# Patient Record
Sex: MALE | Race: WHITE | NOT HISPANIC OR LATINO | Employment: FULL TIME | URBAN - METROPOLITAN AREA
[De-identification: names, ages, dates, MRNs, and addresses within clinical notes are randomized per-mention and may not be internally consistent; named-entity substitution may affect disease eponyms.]

---

## 2017-08-10 ENCOUNTER — TRANSCRIBE ORDERS (OUTPATIENT)
Dept: ADMINISTRATIVE | Facility: HOSPITAL | Age: 27
End: 2017-08-10

## 2017-08-10 ENCOUNTER — APPOINTMENT (OUTPATIENT)
Dept: LAB | Facility: MEDICAL CENTER | Age: 27
End: 2017-08-10

## 2017-08-10 DIAGNOSIS — Z00.8 HEALTH EXAMINATION IN POPULATION SURVEY: ICD-10-CM

## 2017-08-10 DIAGNOSIS — Z00.8 HEALTH EXAMINATION IN POPULATION SURVEY: Primary | ICD-10-CM

## 2017-08-10 LAB
CHOLEST SERPL-MCNC: 217 MG/DL (ref 50–200)
EST. AVERAGE GLUCOSE BLD GHB EST-MCNC: 108 MG/DL
HBA1C MFR BLD: 5.4 % (ref 4.2–6.3)
HDLC SERPL-MCNC: 47 MG/DL (ref 40–60)
LDLC SERPL CALC-MCNC: 135 MG/DL (ref 0–100)
TRIGL SERPL-MCNC: 176 MG/DL

## 2017-08-10 PROCEDURE — 80061 LIPID PANEL: CPT

## 2017-08-10 PROCEDURE — 36415 COLL VENOUS BLD VENIPUNCTURE: CPT

## 2017-08-10 PROCEDURE — 83036 HEMOGLOBIN GLYCOSYLATED A1C: CPT

## 2018-07-26 ENCOUNTER — APPOINTMENT (OUTPATIENT)
Dept: LAB | Facility: MEDICAL CENTER | Age: 28
End: 2018-07-26

## 2018-07-26 ENCOUNTER — TRANSCRIBE ORDERS (OUTPATIENT)
Dept: ADMINISTRATIVE | Facility: HOSPITAL | Age: 28
End: 2018-07-26

## 2018-07-26 DIAGNOSIS — Z00.8 ENCOUNTER FOR OTHER GENERAL EXAMINATION: ICD-10-CM

## 2018-07-26 DIAGNOSIS — Z00.8 ENCOUNTER FOR OTHER GENERAL EXAMINATION: Primary | ICD-10-CM

## 2018-07-26 LAB
CHOLEST SERPL-MCNC: 167 MG/DL (ref 50–200)
EST. AVERAGE GLUCOSE BLD GHB EST-MCNC: 105 MG/DL
HBA1C MFR BLD: 5.3 % (ref 4.2–6.3)
HDLC SERPL-MCNC: 44 MG/DL (ref 40–60)
LDLC SERPL CALC-MCNC: 97 MG/DL (ref 0–100)
NONHDLC SERPL-MCNC: 123 MG/DL
TRIGL SERPL-MCNC: 132 MG/DL

## 2018-07-26 PROCEDURE — 36415 COLL VENOUS BLD VENIPUNCTURE: CPT

## 2018-07-26 PROCEDURE — 83036 HEMOGLOBIN GLYCOSYLATED A1C: CPT

## 2018-07-26 PROCEDURE — 80061 LIPID PANEL: CPT

## 2020-03-26 ENCOUNTER — NURSE TRIAGE (OUTPATIENT)
Dept: OTHER | Facility: OTHER | Age: 30
End: 2020-03-26

## 2020-03-26 DIAGNOSIS — Z20.828 EXPOSURE TO SARS-ASSOCIATED CORONAVIRUS: Primary | ICD-10-CM

## 2020-03-26 DIAGNOSIS — Z20.828 EXPOSURE TO SARS-ASSOCIATED CORONAVIRUS: ICD-10-CM

## 2020-03-26 PROCEDURE — 87635 SARS-COV-2 COVID-19 AMP PRB: CPT

## 2020-03-26 NOTE — TELEPHONE ENCOUNTER
Pt was notified by manager tht he was in contact with a presumed positive pt  Went to work last night and started feeling ill  Temp was checked 100 8, sore throat, cough and very tired  Was advised to go home  Meets criteria for testing  Aware to wait one hour before going to testing site  Self quarantine already in effect

## 2020-03-26 NOTE — TELEPHONE ENCOUNTER
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

## 2020-03-26 NOTE — TELEPHONE ENCOUNTER
Reason for Disposition   [1] Fever or feeling feverish AND [2] within 14 Days of COVID-19 EXPOSURE (Close Contact)    Answer Assessment - Initial Assessment Questions  1  CONFIRMED CASE: "Who is the person with the confirmed COVID-19 infection that you were exposed to?"      Presumed hospitl pt  2  PLACE of CONTACT: "Where were you when you were exposed to COVID-19  (coronavirus disease 2019)?" (e g , city, state, country)      [facility]  3  TYPE of CONTACT: "How much contact was there?" (e g , live in same house, work in same office, same school)      Pt care  4  DATE of CONTACT: "When did you have contact with a coronavirus patient?" (e g , days)    Past few days  5  DURATION of CONTACT: "How long were you in contact with the COVID-19 (coronavirus disease) patient?" (e g , a few seconds, passed by person, a few minutes, live with the patient)     Time of care  6  SYMPTOMS: "Do you have any symptoms?" (e g , fever, cough, breathing difficulty)      Cough 100 8 temp, sore throat fatigue  7  PREGNANCY OR POSTPARTUM: "Is there any chance you are pregnant?" "When was your last menstrual period?" "Did you deliver in the last 2 weeks?"      n/a  8  HIGH RISK: "Do you have any heart or lung problems?  Do you have a weakened immune system?" (e g , CHF, COPD, asthma, HIV positive, chemotherapy, renal failure, diabetes mellitus, sickle cell anemia)      n/a    Protocols used: CORONAVIRUS (COVID-19) EXPOSURE-ADULT-[place]

## 2020-03-28 ENCOUNTER — AMB VIDEO VISIT (OUTPATIENT)
Dept: URGENT CARE | Facility: CLINIC | Age: 30
End: 2020-03-28

## 2020-03-28 DIAGNOSIS — H10.31 ACUTE BACTERIAL CONJUNCTIVITIS OF RIGHT EYE: Primary | ICD-10-CM

## 2020-03-28 RX ORDER — GENTAMICIN SULFATE 3 MG/ML
1 SOLUTION/ DROPS OPHTHALMIC EVERY 4 HOURS
Qty: 5 ML | Refills: 0 | Status: SHIPPED | OUTPATIENT
Start: 2020-03-28 | End: 2020-03-31

## 2020-03-28 NOTE — CARE ANYWHERE EVISITS
Visit Summary for Carine AVILA - Gender: Male - Date of Birth: [id number]  Date: [id number] - Duration: 10 minutes  Patient: Carine AVILA  Provider: Christi Land PA-C    Patient Contact Information  Address  [address] [place] [address]; [address]  [phone]    Visit Topics  Pink eye [Added By: Self - 2020-03-28]    Triage Questions   Have you had any international travel in the last 14 days? Answer [No]  Have you had any exposure to a known or expected Covid-19 patient in the last 14 days? Answer [Yes]  Do you have any immune system compromise or chronic lung disease? Answer [No]  Do you have any vulnerable family members in the home (infant, pregnant, cancer, elderly)? Answer [Yes]     Conversation Transcripts  [0A][0A] [Notification] You are connected with Christi Land PA-C, Urgent Care Specialist [0A][Notification] ISAAC AVILA is located in [place]  [0A][Notification] ISAAC AVILA has shared health history  Maegan Arriola  [0A]    Diagnosis    Procedures  Value: [id number] Code: CPT-4 UNLISTED E&M SERVICE    Medications Prescribed    No prescriptions ordered    Electronically signed by: Hayley Diggs(NPI [id number])

## 2020-03-28 NOTE — PROGRESS NOTES
Assessment/Plan   Diagnoses and all orders for this visit:    Acute bacterial conjunctivitis of right eye  -     gentamicin (GARAMYCIN) 0 3 % ophthalmic solution; Administer 1 drop to the right eye every 4 (four) hours for 7 days While awake     Prescription sent to the pharmacy for gentamicin-use as directed  May use warm compresses externally to decrease swelling of eyelid  Follow-up with an ophthalmologist if symptoms do not improve  Subjective   Patient ID: Ethel Boyer is a 34 y o  male  There were no vitals filed for this visit  HPI  The patient is a 19-year-old male who presents with right eye symptoms that started 2 days ago  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 until his COVID 23 results return  He is currently afebrile  Mild nonproductive cough  Negative shortness of breath or wheezing  Two days ago he noticed some itching and redness of his right eye  This morning his right eye was crusted shut  He denies any vision changes  Negative headache  Negative facial pain or tenderness  He does not wear contacts  Negative eye trauma  Mild congestion  Negative rhinorrhea  Review of Systems   Constitutional: Negative for activity change, chills, fatigue and fever  HENT: Negative for congestion, ear discharge, ear pain, facial swelling, mouth sores, postnasal drip, rhinorrhea, sinus pressure, sinus pain, sneezing, sore throat and trouble swallowing  Eyes: Positive for discharge, redness and itching  Negative for photophobia, pain and visual disturbance  Respiratory: Positive for cough  Negative for apnea, chest tightness, shortness of breath, wheezing and stridor  Cardiovascular: Negative for chest pain  Gastrointestinal: Negative for abdominal distention, abdominal pain, diarrhea, nausea and vomiting  Genitourinary: Negative for difficulty urinating  Musculoskeletal: Negative for arthralgias and myalgias     Skin: Negative for pallor and rash    Allergic/Immunologic: Negative  Neurological: Negative for dizziness, light-headedness and headaches  Hematological: Negative  Psychiatric/Behavioral: Negative  All other systems reviewed and are negative  Objective   Physical Exam  Well-appearing male in no acute distress  No audible wheezing or stridor  Right eye conjunctivitis with upper and lower eyelid erythema and edema  I am unable to visualize drainage from the eye

## 2020-03-31 ENCOUNTER — TELEMEDICINE (OUTPATIENT)
Dept: FAMILY MEDICINE CLINIC | Facility: CLINIC | Age: 30
End: 2020-03-31
Payer: COMMERCIAL

## 2020-03-31 VITALS — WEIGHT: 185 LBS | HEIGHT: 72 IN | BODY MASS INDEX: 25.06 KG/M2

## 2020-03-31 DIAGNOSIS — Z11.4 SCREENING FOR HUMAN IMMUNODEFICIENCY VIRUS: ICD-10-CM

## 2020-03-31 DIAGNOSIS — R42 DIZZINESS: ICD-10-CM

## 2020-03-31 DIAGNOSIS — J06.9 ACUTE URI: Primary | ICD-10-CM

## 2020-03-31 LAB — SARS-COV-2 RNA SPEC QL NAA+PROBE: NOT DETECTED

## 2020-03-31 PROCEDURE — 99203 OFFICE O/P NEW LOW 30 MIN: CPT | Performed by: INTERNAL MEDICINE

## 2020-03-31 RX ORDER — LEVOFLOXACIN 500 MG/1
500 TABLET, FILM COATED ORAL EVERY 24 HOURS
Qty: 10 TABLET | Refills: 0 | Status: SHIPPED | OUTPATIENT
Start: 2020-03-31 | End: 2020-04-10

## 2020-03-31 RX ORDER — GUAIFENESIN/DEXTROMETHORPHAN 100-10MG/5
5 SYRUP ORAL 3 TIMES DAILY PRN
Qty: 118 ML | Refills: 1 | Status: SHIPPED | OUTPATIENT
Start: 2020-03-31

## 2020-03-31 NOTE — ASSESSMENT & PLAN NOTE
Bed Rest  Off work  1 week  Increase Po fluids  Levaquin 500 mg daily Lunch 10  Guaifenesin Liquid  RTC in 1-2 weeks

## 2020-03-31 NOTE — PROGRESS NOTES
Virtual Regular Visit    Problem List Items Addressed This Visit        Respiratory    Acute URI - Primary     Bed Rest  Off work  1 week  Increase Po fluids  Levaquin 500 mg daily Lunch 10  Guaifenesin Liquid  RTC in 1-2 weeks         Relevant Medications    levofloxacin (LEVAQUIN) 500 mg tablet    dextromethorphan-guaiFENesin (ROBITUSSIN DM)  mg/5 mL syrup       Other    Dizziness     Most likely is due to acute URI/Sinusitis and Mild Dehydration : Bed rest  Increase Po fluids  Stay off work 1 week  As above    Relevant Medications    levofloxacin (LEVAQUIN) 500 mg tablet    dextromethorphan-guaiFENesin (ROBITUSSIN DM)  mg/5 mL syrup      Other Visit Diagnoses     Screening for human immunodeficiency virus                   Reason for visit is : new Patient visit    Encounter provider Roula Negron MD    Provider located at [place] [address]  [address] [place] [address]  [address] [address]      Recent Visits  No visits were found meeting these conditions  Showing recent visits within past 7 days and meeting all other requirements     Today's Visits  Date Type Provider Dept   03/31/20 Carmen Lion MD [facility] [place]   Showing today's visits and meeting all other requirements     Future Appointments  No visits were found meeting these conditions  Showing future appointments within next 150 days and meeting all other requirements        The patient was identified by name and date of birth  Cinthia Burgess was informed that this is a telemedicine visit and that the visit is being conducted through [address] [place] [address] and patient was informed that this is not a secure, HIPAA-complaint platform  he agrees to proceed     My office door was closed  No one else was in the room  He acknowledged consent and understanding of privacy and security of the video platform   The patient has agreed to participate and understands they can discontinue the visit at any time  Patient is aware this is a billable service  Subjective  Shannon Raza is a 34 y o  male first time Video visit, since last week he has been coughing, had one time Fever, COVId 19 was Negative, No other symptoms,  Detailed H/P Discussed w Pt     No past medical history on file  History reviewed  No pertinent surgical history  Current Outpatient Medications   Medication Sig Dispense Refill    dextromethorphan-guaiFENesin (ROBITUSSIN DM)  mg/5 mL syrup Take 5 mL by mouth 3 (three) times a day as needed for cough or congestion 118 mL 1    levofloxacin (LEVAQUIN) 500 mg tablet Take 1 tablet (500 mg total) by mouth every 24 hours for 10 days With food/Lunch 10 tablet 0     No current facility-administered medications for this visit  No Known Allergies    Review of Systems   Constitutional: Negative for chills, fatigue and fever  HENT: Positive for postnasal drip and sinus pressure  Negative for congestion, facial swelling, sore throat, trouble swallowing and voice change  Eyes: Negative for pain, discharge and visual disturbance  Respiratory: Positive for cough  Negative for shortness of breath and wheezing  Cardiovascular: Negative for chest pain, palpitations and leg swelling  Gastrointestinal: Negative for abdominal pain, blood in stool, constipation, diarrhea and nausea  Endocrine: Negative for polydipsia, polyphagia and polyuria  Genitourinary: Negative for difficulty urinating, hematuria and urgency  Musculoskeletal: Negative for arthralgias and myalgias  Skin: Negative for rash  Neurological: Positive for dizziness  Negative for tremors, weakness and headaches  Hematological: Negative for adenopathy  Does not bruise/bleed easily  Psychiatric/Behavioral: Negative for dysphoric mood, sleep disturbance and suicidal ideas         Video Exam    Vitals:    03/31/20 1548   Weight: 83 9 kg (185 lb)   Height: 6' (1 829 m) Physical Exam   Constitutional: He is oriented to person, place, and time  He appears well-developed and well-nourished  No distress  HENT:   Head: Normocephalic  Right Ear: External ear normal    Left Ear: External ear normal    Eyes: Pupils are equal, round, and reactive to light  EOM are normal    Neck: Neck supple  No tracheal deviation present  No thyromegaly present  Cardiovascular: Normal rate, regular rhythm and normal heart sounds  Exam reveals no friction rub  No murmur heard  Pulmonary/Chest: Effort normal and breath sounds normal  No respiratory distress  He has no wheezes  Abdominal: Soft  Bowel sounds are normal  He exhibits no distension  Musculoskeletal: Normal range of motion  He exhibits no edema, tenderness or deformity  Neurological: He is alert and oriented to person, place, and time  He displays normal reflexes  No cranial nerve deficit  Coordination normal    Skin: Skin is warm and dry  No rash noted  No erythema  Psychiatric: He has a normal mood and affect  His behavior is normal         I spent  20  minutes with the patient during this visit

## 2020-03-31 NOTE — ASSESSMENT & PLAN NOTE
Most likely is due to acute URI/Sinusitis and Mild Dehydration : Bed rest  Increase Po fluids  Stay off work 1 week  As above

## 2020-12-29 ENCOUNTER — TELEPHONE (OUTPATIENT)
Dept: FAMILY MEDICINE CLINIC | Facility: CLINIC | Age: 30
End: 2020-12-29

## 2020-12-29 ENCOUNTER — NURSE TRIAGE (OUTPATIENT)
Dept: OTHER | Facility: OTHER | Age: 30
End: 2020-12-29

## 2020-12-29 DIAGNOSIS — Z20.822 EXPOSURE TO COVID-19 VIRUS: Primary | ICD-10-CM

## 2020-12-29 DIAGNOSIS — Z20.822 COVID-19 RULED OUT: Primary | ICD-10-CM

## 2020-12-29 DIAGNOSIS — Z20.822 COVID-19 RULED OUT: ICD-10-CM

## 2020-12-29 PROCEDURE — U0003 INFECTIOUS AGENT DETECTION BY NUCLEIC ACID (DNA OR RNA); SEVERE ACUTE RESPIRATORY SYNDROME CORONAVIRUS 2 (SARS-COV-2) (CORONAVIRUS DISEASE [COVID-19]), AMPLIFIED PROBE TECHNIQUE, MAKING USE OF HIGH THROUGHPUT TECHNOLOGIES AS DESCRIBED BY CMS-2020-01-R: HCPCS | Performed by: INTERNAL MEDICINE

## 2020-12-31 LAB — SARS-COV-2 RNA SPEC QL NAA+PROBE: DETECTED

## 2021-01-07 ENCOUNTER — TELEMEDICINE (OUTPATIENT)
Dept: FAMILY MEDICINE CLINIC | Facility: CLINIC | Age: 31
End: 2021-01-07
Payer: COMMERCIAL

## 2021-01-07 DIAGNOSIS — Z11.4 SCREENING FOR HUMAN IMMUNODEFICIENCY VIRUS: Primary | ICD-10-CM

## 2021-01-07 DIAGNOSIS — U07.1 LAB TEST POSITIVE FOR DETECTION OF COVID-19 VIRUS: ICD-10-CM

## 2021-01-07 PROCEDURE — 99213 OFFICE O/P EST LOW 20 MIN: CPT | Performed by: INTERNAL MEDICINE

## 2021-01-07 NOTE — LETTER
January 7, 2021     Patient: Hina Ponce   YOB: 1990   Date of Visit: 1/7/2021       To Whom it May Concern:    Hina Ponce is under my professional care  He was seen in my office on 1/7/2021  He may return to work on Monday, January 11, 2021  If you have any questions or concerns, please don't hesitate to call           Sincerely,              Davidson Mills MD        CC: No Recipients

## 2021-01-07 NOTE — PROGRESS NOTES
Virtual Regular Visit      Assessment/Plan:    Problem List Items Addressed This Visit        Other    Lab test positive for detection of COVID-19 virus     Pt feels ok  No symptoms,  RTW on Monday 1/11/21  RTC in 1-2 mos w Blood work         Screening for human immunodeficiency virus - Primary     With next visit         Relevant Orders    HIV 1/2 Antigen/Antibody (4th Generation) w Reflex [facility]               Reason for visit is   Chief Complaint   Patient presents with    Virtual Regular Visit    Virtual Regular Visit        Encounter provider Jhon Wells MD    Provider located at [facility]  [address] [address]  [phone]      Recent Visits  No visits were found meeting these conditions  Showing recent visits within past 7 days and meeting all other requirements     Today's Visits  Date Type Provider Dept   01/07/21 Roddy Ganser, MD [facility] [place]   Showing today's visits and meeting all other requirements     Future Appointments  No visits were found meeting these conditions  Showing future appointments within next 150 days and meeting all other requirements        The patient was identified by name and date of birth  Corby Escamilla was informed that this is a telemedicine visit and that the visit is being conducted through [employer] and patient was informed that this is not a secure, HIPAA-compliant platform  He agrees to proceed     My office door was closed  No one else was in the room  He acknowledged consent and understanding of privacy and security of the video platform  The patient has agreed to participate and understands they can discontinue the visit at any time  Patient is aware this is a billable service  Subjective  Corby Escamilla is a 31 y o  male is here for Virtual visit as Below :        31 Y O Man is here for [facility] visit, He feels ok, no New symptoms,           History reviewed  No pertinent past medical history  History reviewed  No pertinent surgical history  Current Outpatient Medications   Medication Sig Dispense Refill    dextromethorphan-guaiFENesin (ROBITUSSIN DM)  mg/5 mL syrup Take 5 mL by mouth 3 (three) times a day as needed for cough or congestion 118 mL 1     No current facility-administered medications for this visit  No Known Allergies    Review of Systems   Constitutional: Negative for chills, fatigue and fever  HENT: Negative for congestion, facial swelling, sore throat, trouble swallowing and voice change  Eyes: Negative for pain, discharge and visual disturbance  Respiratory: Negative for cough, shortness of breath and wheezing  Cardiovascular: Negative for chest pain, palpitations and leg swelling  Gastrointestinal: Negative for abdominal pain, blood in stool, constipation, diarrhea and nausea  Endocrine: Negative for polydipsia, polyphagia and polyuria  Genitourinary: Negative for difficulty urinating, hematuria and urgency  Musculoskeletal: Negative for arthralgias and myalgias  Skin: Negative for rash  Neurological: Negative for dizziness, tremors, weakness and headaches  Hematological: Negative for adenopathy  Does not bruise/bleed easily  Psychiatric/Behavioral: Negative for dysphoric mood, sleep disturbance and suicidal ideas  Video Exam    There were no vitals filed for this visit  Physical Exam  Constitutional:       General: He is not in acute distress  HENT:      Head: Normocephalic  Mouth/Throat:      Pharynx: No oropharyngeal exudate  Eyes:      General: No scleral icterus  Conjunctiva/sclera: Conjunctivae normal       Pupils: Pupils are equal, round, and reactive to light  Neck:      Musculoskeletal: Neck supple  Thyroid: No thyromegaly  Cardiovascular:      Rate and Rhythm: Normal rate and regular rhythm  Heart sounds: Normal heart sounds  No murmur     Pulmonary: Effort: Pulmonary effort is normal  No respiratory distress  Breath sounds: Normal breath sounds  No wheezing or rales  Abdominal:      General: Bowel sounds are normal  There is no distension  Palpations: Abdomen is soft  Tenderness: There is no abdominal tenderness  There is no guarding or rebound  Musculoskeletal:         General: No tenderness  Lymphadenopathy:      Cervical: No cervical adenopathy  Skin:     Coloration: Skin is not pale  Neurological:      Mental Status: He is alert and oriented to person, place, and time  I spent 20 minutes directly with the patient during this visit      VIRTUAL VISIT [address] [place] [address] acknowledges that he has consented to an online visit or consultation  He understands that the online visit is based solely on information provided by him, and that, in the absence of a face-to-face physical evaluation by the physician, the diagnosis he receives is both limited and provisional in terms of accuracy and completeness  This is not intended to replace a full medical face-to-face evaluation by the physician  Louie Rolle understands and accepts these terms

## 2021-01-15 ENCOUNTER — IMMUNIZATIONS (OUTPATIENT)
Dept: FAMILY MEDICINE CLINIC | Facility: HOSPITAL | Age: 31
End: 2021-01-15

## 2021-01-15 DIAGNOSIS — Z23 ENCOUNTER FOR IMMUNIZATION: Primary | ICD-10-CM

## 2021-01-15 PROCEDURE — 91300 SARS-COV-2 / COVID-19 MRNA VACCINE (PFIZER-BIONTECH) 30 MCG: CPT

## 2021-01-15 PROCEDURE — 0001A SARS-COV-2 / COVID-19 MRNA VACCINE (PFIZER-BIONTECH) 30 MCG: CPT

## 2021-02-03 ENCOUNTER — IMMUNIZATIONS (OUTPATIENT)
Dept: FAMILY MEDICINE CLINIC | Facility: HOSPITAL | Age: 31
End: 2021-02-03

## 2021-02-03 DIAGNOSIS — Z23 ENCOUNTER FOR IMMUNIZATION: Primary | ICD-10-CM

## 2021-02-03 PROCEDURE — 91300 SARS-COV-2 / COVID-19 MRNA VACCINE (PFIZER-BIONTECH) 30 MCG: CPT

## 2021-02-03 PROCEDURE — 0002A SARS-COV-2 / COVID-19 MRNA VACCINE (PFIZER-BIONTECH) 30 MCG: CPT

## 2021-09-01 ENCOUNTER — APPOINTMENT (OUTPATIENT)
Dept: LAB | Facility: HOSPITAL | Age: 31
End: 2021-09-01

## 2021-09-01 ENCOUNTER — APPOINTMENT (OUTPATIENT)
Dept: LAB | Facility: HOSPITAL | Age: 31
End: 2021-09-01
Payer: COMMERCIAL

## 2021-09-01 DIAGNOSIS — Z11.4 SCREENING FOR HUMAN IMMUNODEFICIENCY VIRUS: ICD-10-CM

## 2021-09-01 DIAGNOSIS — Z00.8 HEALTH EXAMINATION IN POPULATION SURVEY: ICD-10-CM

## 2021-09-01 LAB
CHOLEST SERPL-MCNC: 189 MG/DL (ref 50–200)
EST. AVERAGE GLUCOSE BLD GHB EST-MCNC: 103 MG/DL
HBA1C MFR BLD: 5.2 %
HDLC SERPL-MCNC: 39 MG/DL
LDLC SERPL CALC-MCNC: 100 MG/DL (ref 0–100)
NONHDLC SERPL-MCNC: 150 MG/DL
TRIGL SERPL-MCNC: 251 MG/DL

## 2021-09-01 PROCEDURE — 80061 LIPID PANEL: CPT

## 2021-09-01 PROCEDURE — 36415 COLL VENOUS BLD VENIPUNCTURE: CPT

## 2021-09-01 PROCEDURE — 87389 HIV-1 AG W/HIV-1&-2 AB AG IA: CPT

## 2021-09-01 PROCEDURE — 83036 HEMOGLOBIN GLYCOSYLATED A1C: CPT

## 2021-09-02 LAB — HIV 1+2 AB+HIV1 P24 AG SERPL QL IA: NORMAL

## 2021-12-16 ENCOUNTER — IMMUNIZATIONS (OUTPATIENT)
Dept: FAMILY MEDICINE CLINIC | Facility: HOSPITAL | Age: 31
End: 2021-12-16

## 2021-12-16 DIAGNOSIS — Z23 ENCOUNTER FOR IMMUNIZATION: Primary | ICD-10-CM

## 2021-12-16 PROCEDURE — 91300 COVID-19 PFIZER VACC 0.3 ML: CPT

## 2021-12-16 PROCEDURE — 0001A COVID-19 PFIZER VACC 0.3 ML: CPT

## 2022-08-25 ENCOUNTER — OFFICE VISIT (OUTPATIENT)
Dept: FAMILY MEDICINE CLINIC | Facility: CLINIC | Age: 32
End: 2022-08-25
Payer: COMMERCIAL

## 2022-08-25 VITALS
HEIGHT: 73 IN | WEIGHT: 183 LBS | OXYGEN SATURATION: 98 % | DIASTOLIC BLOOD PRESSURE: 72 MMHG | BODY MASS INDEX: 24.25 KG/M2 | HEART RATE: 74 BPM | TEMPERATURE: 97.7 F | SYSTOLIC BLOOD PRESSURE: 120 MMHG

## 2022-08-25 DIAGNOSIS — Z76.89 ENCOUNTER TO ESTABLISH CARE: ICD-10-CM

## 2022-08-25 DIAGNOSIS — Z00.00 ANNUAL PHYSICAL EXAM: Primary | ICD-10-CM

## 2022-08-25 PROCEDURE — 99385 PREV VISIT NEW AGE 18-39: CPT | Performed by: FAMILY MEDICINE

## 2022-08-25 NOTE — ASSESSMENT & PLAN NOTE
- Satisfactory physical examination  - Hemoglobin A1C and lipid panel ordered   - Patient declines Hepatitis C screening at this time  - Return in the fall/winter for Influenza vaccination

## 2022-08-25 NOTE — PROGRESS NOTES
ADULT ANNUAL PHYSICAL  [address] [place] [address]    NAME: Marine Drummond  AGE: 36 y o  SEX: male  : 1990     DATE: 2022     Assessment and Plan:     Problem List Items Addressed This Visit        Other    Annual physical exam - Primary     - Satisfactory physical examination  - Hemoglobin A1C and lipid panel ordered   - Patient declines Hepatitis C screening at this time  - Return in the fall/winter for Influenza vaccination          Relevant Orders    Hemoglobin A1C    Lipid Panel with Direct LDL reflex      Other Visit Diagnoses     Encounter to establish care              Immunizations and preventive care screenings were discussed with patient today  Appropriate education was printed on patient's after visit summary  Counseling:  · Exercise: the importance of regular exercise/physical activity was discussed  Recommend exercise 3-5 times per week for at least 30 minutes  Return in about 1 year (around 2023), or if symptoms worsen or fail to improve, for Annual physical      Chief Complaint:     Chief Complaint   Patient presents with    Caring Starts With You      History of Present Illness:     Adult Annual Physical   Marine Drummond is a pleasant 36year old male with no past medical history who presents today for a comprehensive physical exam as part of the 'Caring Starts With You' initiative by [facility]  Overall he feels well and endorses no complaints at this time  Diet and Physical Activity  · Diet/Nutrition: Patient tries to adhere to a well balanced diet  · Exercise: no formal exercise  Depression Screening  PHQ-2/9 Depression Screening    Little interest or pleasure in doing things: 0 - not at all  Feeling down, depressed, or hopeless: 0 - not at all  PHQ-2 Score: 0  PHQ-2 Interpretation: Negative depression screen       General Health  · Sleep: Gets 6-7 hours of sleep on average  · Hearing: No hearing issues    · Vision: no vision problems  · Dental: regular dental visits  [facility]  · History of STDs?: no      Review of Systems:     Review of Systems   Constitutional: Negative  HENT: Negative  Eyes: Negative  Respiratory: Negative  Cardiovascular: Negative  Gastrointestinal: Negative  Musculoskeletal: Negative  Skin: Negative  Neurological: Negative  Psychiatric/Behavioral: Negative  Past Medical History:     History reviewed  No pertinent past medical history  Past Surgical History:     History reviewed  No pertinent surgical history  Social History:     Social History     Socioeconomic History    Marital status: /Civil Union     Spouse name: None    Number of children: None    Years of education: None    Highest education level: None   Occupational History    None   Tobacco Use    Smoking status: Never Smoker    Smokeless tobacco: Never Used   Vaping Use    Vaping Use: Never used   Substance and Sexual Activity    Alcohol use: Never    Drug use: Never    Sexual activity: None   Other Topics Concern    None   Social History Narrative    None     Social Determinants of Health     Financial Resource Strain: Not on file   Food Insecurity: Not on file   Transportation Needs: Not on file   Physical Activity: Not on file   Stress: Not on file   Social Connections: Not on file   Intimate Partner Violence: Not on file   Housing Stability: Not on file      Family History:     Family History   Problem Relation Age of Onset    Thyroid disease Father     Diabetes Maternal Grandmother     Diabetes Maternal Grandfather     Diabetes Paternal Grandmother     Diabetes Paternal Grandfather       Current Medications:     Current Outpatient Medications   Medication Sig Dispense Refill    Minoxidil 5 % FOAM  (Patient not taking: Reported on 8/25/2022)       No current facility-administered medications for this visit        Allergies:     No Known Allergies   Physical Exam:     BP 120/72   Pulse 74   Temp 97 7 °F (36 5 °C) (Skin)   Ht 6' 1" (1 854 m)   Wt 83 kg (183 lb)   SpO2 98%   BMI 24 14 kg/m²     Physical Exam  Constitutional:       General: He is not in acute distress  Appearance: He is not ill-appearing  HENT:      Head: Normocephalic and atraumatic  Mouth/Throat:      Mouth: Mucous membranes are moist       Pharynx: No oropharyngeal exudate or posterior oropharyngeal erythema  Eyes:      General:         Right eye: No discharge  Left eye: No discharge  Extraocular Movements: Extraocular movements intact  Pupils: Pupils are equal, round, and reactive to light  Cardiovascular:      Rate and Rhythm: Normal rate  Pulses: Normal pulses  Heart sounds: No murmur heard  Pulmonary:      Effort: Pulmonary effort is normal  No respiratory distress  Breath sounds: No wheezing  Abdominal:      General: Bowel sounds are normal  There is no distension  Palpations: Abdomen is soft  Tenderness: There is no abdominal tenderness  There is no guarding  Musculoskeletal:      Cervical back: Normal range of motion and neck supple  Right lower leg: No edema  Left lower leg: No edema  Lymphadenopathy:      Cervical: No cervical adenopathy  Neurological:      General: No focal deficit present  Mental Status: He is alert  Motor: No weakness        Coordination: Coordination normal       Gait: Gait normal       Deep Tendon Reflexes: Reflexes normal    Psychiatric:         Mood and Affect: Mood normal          Behavior: Behavior normal           Bhavna Patel MD   [address]

## 2023-05-17 ENCOUNTER — HOSPITAL ENCOUNTER (EMERGENCY)
Facility: HOSPITAL | Age: 33
Discharge: HOME | End: 2023-05-17
Attending: EMERGENCY MEDICINE

## 2023-05-17 VITALS
DIASTOLIC BLOOD PRESSURE: 84 MMHG | SYSTOLIC BLOOD PRESSURE: 142 MMHG | HEART RATE: 114 BPM | RESPIRATION RATE: 20 BRPM | OXYGEN SATURATION: 99 %

## 2023-05-17 DIAGNOSIS — R00.0 RAPID HEART RATE: ICD-10-CM

## 2023-05-17 DIAGNOSIS — I47.1 SVT (SUPRAVENTRICULAR TACHYCARDIA) (HCC): Primary | ICD-10-CM

## 2023-05-17 LAB
ANION GAP SERPL CALCULATED.3IONS-SCNC: 3 MMOL/L (ref 4–13)
BUN SERPL-MCNC: 15 MG/DL (ref 5–25)
CALCIUM SERPL-MCNC: 9.3 MG/DL (ref 8.3–10.1)
CHLORIDE SERPL-SCNC: 107 MMOL/L (ref 96–108)
CO2 SERPL-SCNC: 25 MMOL/L (ref 21–32)
CREAT SERPL-MCNC: 1.15 MG/DL (ref 0.6–1.3)
GFR SERPL CREATININE-BSD FRML MDRD: 84 ML/MIN/1.73SQ M
GLUCOSE SERPL-MCNC: 117 MG/DL (ref 65–140)
POTASSIUM SERPL-SCNC: 3.5 MMOL/L (ref 3.5–5.3)
SODIUM SERPL-SCNC: 135 MMOL/L (ref 135–147)

## 2023-05-17 RX ORDER — ADENOSINE 3 MG/ML
INJECTION, SOLUTION INTRAVENOUS
Status: COMPLETED
Start: 2023-05-17 | End: 2023-05-17

## 2023-05-17 RX ORDER — ADENOSINE 3 MG/ML
6 INJECTION INTRAVENOUS ONCE
Status: COMPLETED | OUTPATIENT
Start: 2023-05-17 | End: 2023-05-17

## 2023-05-17 RX ADMIN — SODIUM CHLORIDE 1000 ML: 0.9 INJECTION, SOLUTION INTRAVENOUS at 22:26

## 2023-05-17 RX ADMIN — ADENOSINE 6 MG: 3 INJECTION INTRAVENOUS at 22:21

## 2023-05-17 NOTE — Clinical Note
Wellington Mueller was seen and treated in our emergency department on 5/17/2023  No restrictions            Diagnosis: ISAACT    Lui    He may return on this date: 05/17/2023         If you have any questions or concerns, please don't hesitate to call        Ann Chadwick MD    ______________________________           _______________          _______________  Hospital Representative                              Date                                Time

## 2023-05-18 LAB
ATRIAL RATE: 149 BPM
ATRIAL RATE: 199 BPM
P AXIS: -65 DEGREES
P AXIS: 62 DEGREES
PR INTERVAL: 140 MS
QRS AXIS: 101 DEGREES
QRS AXIS: 98 DEGREES
QRSD INTERVAL: 76 MS
QRSD INTERVAL: 82 MS
QT INTERVAL: 232 MS
QT INTERVAL: 258 MS
QTC INTERVAL: 406 MS
QTC INTERVAL: 422 MS
T WAVE AXIS: -23 DEGREES
T WAVE AXIS: -23 DEGREES
VENTRICULAR RATE: 149 BPM
VENTRICULAR RATE: 199 BPM

## 2023-05-18 NOTE — DISCHARGE INSTRUCTIONS
Make sure to follow up with cardiology  Decrease/Limit caffeine intake  Return immediately to the ED for similar symptoms or any new or concerning symptoms

## 2023-05-18 NOTE — ED PROVIDER NOTES
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 and got a notification from his watch about high heart rate with some palpitations  Patient's coworkers placed a pulse oximeter on that read as heart rate in the 230s  Patient denied dizziness on exam   Patient states he only came down due to the urging of his coworkers  Denies cocaine use, endorses drinking 1 energy drink with caffeine earlier today  Denies fevers, chills, nausea, vomiting, abdominal pain, chest pain, shortness of breath, recent changes in medication or health history, or any other complaints or concerns at this time  Prior to Admission Medications   Prescriptions Last Dose Informant Patient Reported? Taking? Minoxidil 5 % FOAM   Yes No   Patient not taking: Reported on 8/25/2022      Facility-Administered Medications: None       History reviewed  No pertinent past medical history  History reviewed  No pertinent surgical history  Family History   Problem Relation Age of Onset   • Thyroid disease Father    • Diabetes Maternal Grandmother    • Diabetes Maternal Grandfather    • Diabetes Paternal Grandmother    • Diabetes Paternal Grandfather      I have reviewed and agree with the history as documented      E-Cigarette/Vaping   • E-Cigarette Use Never User      E-Cigarette/Vaping Substances   • Nicotine No    • THC No    • CBD No    • Flavoring No    • Other No    • Unknown No      Social History     Tobacco Use   • Smoking status: Never   • Smokeless tobacco: Never   Vaping Use   • Vaping Use: Never used   Substance Use Topics   • Alcohol use: Never   • Drug use: Never        Review of Systems Constitutional: Negative for chills and fever  HENT: Negative for congestion and sore throat  Eyes: Negative for pain and visual disturbance  Respiratory: Negative for cough and shortness of breath  Cardiovascular: Positive for palpitations  Negative for chest pain  Gastrointestinal: Negative for abdominal pain, diarrhea, nausea and vomiting  Genitourinary: Negative for dysuria and hematuria  Musculoskeletal: Negative for back pain and myalgias  Skin: Negative for color change and rash  Neurological: Negative for dizziness and headaches  All other systems reviewed and are negative  Physical Exam  ED Triage Vitals   Temp Pulse Respirations Blood Pressure SpO2   -- 05/17/23 2210 05/17/23 2210 05/17/23 2213 05/17/23 2210    (!) 228 22 (!) 168/103 100 %      Temp src Heart Rate Source Patient Position - Orthostatic VS BP Location FiO2 (%)   -- 05/17/23 2210 05/17/23 2213 05/17/23 2213 --    Monitor Lying Right arm       Pain Score       05/17/23 2210       No Pain             Orthostatic Vital Signs  Vitals:    05/17/23 2210 05/17/23 2213 05/17/23 2330   BP:  (!) 168/103 142/84   Pulse: (!) 228  (!) 114   Patient Position - Orthostatic VS:  Lying Lying       Physical Exam  Vitals and nursing note reviewed  Constitutional:       General: He is not in acute distress  HENT:      Head: Normocephalic and atraumatic  Nose: No congestion or rhinorrhea  Mouth/Throat:      Mouth: Mucous membranes are moist       Pharynx: Oropharynx is clear  Eyes:      General: No scleral icterus  Extraocular Movements: Extraocular movements intact  Conjunctiva/sclera: Conjunctivae normal    Cardiovascular:      Rate and Rhythm: Regular rhythm  Tachycardia present  Pulses: Normal pulses  Pulmonary:      Effort: Pulmonary effort is normal  No respiratory distress  Breath sounds: Normal breath sounds  No wheezing, rhonchi or rales  Abdominal:      General: There is no distension  Tenderness: There is no abdominal tenderness  There is no guarding or rebound  Musculoskeletal:         General: No deformity or signs of injury  Normal range of motion  Cervical back: Normal range of motion and neck supple  Skin:     General: Skin is warm  Capillary Refill: Capillary refill takes less than 2 seconds  Coloration: Skin is not pale  Findings: No bruising  Neurological:      Mental Status: He is alert  Mental status is at baseline     Psychiatric:         Mood and Affect: Mood normal          Behavior: Behavior normal          ED Medications  Medications   adenosine (ADENOCARD) injection 6 mg (6 mg Intravenous Given 5/17/23 2221)   sodium chloride 0 9 % bolus 1,000 mL (0 mL Intravenous Stopped 5/17/23 2356)       Diagnostic Studies  Results Reviewed     Procedure Component Value Units Date/Time    Basic metabolic panel [[id number]]  (Abnormal) Collected: 05/17/23 2241    Lab Status: Final result Specimen: Blood from Arm, Left Updated: 05/17/23 2327     Sodium 135 mmol/L      Potassium 3 5 mmol/L      Chloride 107 mmol/L      CO2 25 mmol/L      ANION GAP 3 mmol/L      BUN 15 mg/dL      Creatinine 1 15 mg/dL      Glucose 117 mg/dL      Calcium 9 3 mg/dL      eGFR 84 ml/min/1 73sq m     Narrative:      [place] guidelines for Chronic Kidney Disease (CKD):   •  Stage 1 with normal or high GFR (GFR > 90 mL/min/1 73 square meters)  •  Stage 2 Mild CKD (GFR = 60-89 mL/min/1 73 square meters)  •  Stage 3A Moderate CKD (GFR = 45-59 mL/min/1 73 square meters)  •  Stage 3B Moderate CKD (GFR = 30-44 mL/min/1 73 square meters)  •  Stage 4 Severe CKD (GFR = 15-29 mL/min/1 73 square meters)  •  Stage 5 End Stage CKD (GFR <15 mL/min/1 73 square meters)  Note: GFR calculation is accurate only with a steady state creatinine                 No orders to display         Procedures  Procedures      ED Course                                       Medical Decision Making  Amount and/or Complexity of Data Reviewed  Labs: ordered  Risk  Prescription drug management  Patient is a 30-year-old male presents ED for evaluation of tachycardia  Patient seen immediately upon arrival, noted to have heart rate in the 230s  EKG shows SVT greater than 200  Vagal maneuvers x2 without success  Patient treated with adenosine 6 mg IV with successful conversion to sinus tachycardia  Normal saline 1 L bolus ordered  BMP ordered  See ED course for additional details  Patient reevaluated, vital signs stable  We will continue to monitor on telemetry  Initial EKG at 2214 as read by me shows SVT at 199 bpm with right axis deviation, no ST elevations or depressions  No prior for comparison  Repeat EKG at 2221 after adenosine administration as read by me shows sinus tachycardia at 149 with right axis deviation, no ST elevations or depressions  Improved compared to prior  Discussed results and plan with patient  Advised on need for outpatient follow up, given information for cardiology  Given return precautions verbally and in discharge instructions  Given work note  All questions answered  Patient expressed verbal understanding and is agreeable with plan for discharge with outpatient follow up  Disposition  Final diagnoses:   SVT (supraventricular tachycardia) ([facility] )   Rapid heart rate     Time reflects when diagnosis was documented in both MDM as applicable and the Disposition within this note     Time User Action Codes Description Comment    5/17/2023 11:35 PM Pritesh Villarreal [I47 1] SVT (supraventricular tachycardia) ([facility] )     5/17/2023 11:35 PM Stone Sheehan [R00 0] Rapid heart rate       ED Disposition     ED Disposition   Discharge    Condition   Stable    Date/Time   Wed May 17, 2023 11:35 PM    Bronwyn Bowles discharge to home/self care                 Follow-up Information     Follow up With Specialties Details Why Contact Info Additional [address] Emergency Department Emergency Medicine Go to  If symptoms worsen [address] [id number]  [facility] Emergency Department, [address] [address], [place], [place], [address]    1282 [facility] Cardiology Call today To make appt  for evaluation in the next month 4940 [facility], [address] [place], [place] [id number], [place], [address]          Discharge Medication List as of 5/17/2023 11:43 PM      CONTINUE these medications which have NOT CHANGED    Details   Minoxidil 5 % FOAM Historical Med           No discharge procedures on file  PDMP Review     None           ED Provider  Attending physically available and evaluated Albin Simmons I managed the patient along with the ED Attending      Electronically Signed by         Eber Marcial DO  05/18/23 0900

## 2023-05-18 NOTE — ED ATTENDING ATTESTATION
Final Diagnoses:     1  SVT (supraventricular tachycardia) ([facility] )    2  Rapid heart rate      ED Course as of 05/17/23 2335   Wed May 17, 2023   2318 He's awake, alert  Feels better    Will likely DC once BMP returns  Octavio Aguilar MD, saw and evaluated the patient  All available labs and X-rays were ordered by me or the resident and have been reviewed by myself  I discussed the patient with the resident / non-physician and agree with the resident's / non-physician practitioner's findings and plan as documented in the resident's / non-physician practicitioner's note, except where noted  At this point, I agree with the current assessment done in the ED  I was present during key portions of all procedures performed unless otherwise stated  Nursing Triage:     Chief Complaint   Patient presents with   • Rapid Heart Rate     Pt reports rapid heart rate from his watch, Pt notes some lightheadedness but denies any other symptoms       HPI:   This is a 29 y o  male presenting for evaluation of SVT  The patient all of a sudden RIGHT before coming to the ER 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 6mg given with stopping   - Repeat EKG appears Sinus Tachycardia with improvement in symptoms  - Will DC     - POCUS Cardiac images collected by me for educational purposes  Non-billing  Physical:   General: VS reviewed  Appears in NAD  awake, alert  Well-nourished, well-developed  Appears stated age  Speaking normally in full sentences  Head: Normocephalic, atraumatic  Eyes: EOM-I  No diplopia  No hyphema  No subconjunctival hemorrhages  Symmetrical lids     ENT: Atraumatic external nose and ears  MMM  No malocclusion  No stridor  Normal phonation  No drooling  Normal swallowing  Neck: No JVD  CV: No pallor noted  Tachycardic  on monitor  No diaphoresis  Lungs:   No tachypnea  No respiratory distress  Abd: soft nt nd no rebound/guarding  MSK:   FROM spontaneously  Skin: Dry, intact  Neuro: Awake, alert, GCS15, CN II-XII grossly intact  Motor grossly intact  Psychiatric/Behavioral: interacting normally; appropriate mood/affect   Exam: deferred    Vitals:    05/17/23 2210 05/17/23 2213   BP:  (!) 168/103   BP Location:  Right arm   Pulse: (!) 228    Resp: 22    SpO2: 100%      - There are no obvious limitations to social determinants of care  - Nursing note reviewed  - Vitals reviewed  - Orders placed by myself and/or advanced practitioner / resident     - Previous chart was reviewed  - No language barrier    - History obtained from patient  - There are no limitations to the history obtained:     Past Medical:    has no past medical history on file  Past Surgical:    has no past surgical history on file  Social:     Social History     Substance and Sexual Activity   Alcohol Use Never     Social History     Tobacco Use   Smoking Status Never   Smokeless Tobacco Never     Social History     Substance and Sexual Activity   Drug Use Never       Echo:   No results found for this or any previous visit  No results found for this or any previous visit  Cath:    No results found for this or any previous visit        Code Status: No Order  Advance Directive and Living Will:      Power of :    POLST:    Medications   adenosine (ADENOCARD) injection 6 mg (6 mg Intravenous Given 5/17/23 2221)   sodium chloride 0 9 % bolus 1,000 mL (1,000 mL Intravenous New Bag 5/17/23 2226)     No orders to display     Orders Placed This Encounter   Procedures   • Basic metabolic panel   • ECG 12 lead   • ECG 12 lead     Labs Reviewed   BASIC METABOLIC PANEL - Abnormal Result Value Ref Range Status    Sodium 135  135 - 147 mmol/L Final    Potassium 3 5  3 5 - 5 3 mmol/L Final    Comment: Slightly Hemolyzed; Results May be Affected    Chloride 107  96 - 108 mmol/L Final    CO2 25  21 - 32 mmol/L Final    ANION GAP 3 (*) 4 - 13 mmol/L Final    BUN 15  5 - 25 mg/dL Final    Creatinine 1 15  0 60 - 1 30 mg/dL Final    Comment: Standardized to IDMS reference method    Glucose 117  65 - 140 mg/dL Final    Comment: Specimen collection should occur prior to Sulfasalazine administration due to the potential for falsely depressed results  Specimen collection should occur prior to Sulfapyridine administration due to the potential for falsely elevated results  If the patient is fasting, the ADA then defines impaired fasting glucose as > 100 mg/dL and diabetes as > or equal to 123 mg/dL      Calcium 9 3  8 3 - 10 1 mg/dL Final    eGFR 84  ml/min/1 73sq m Final    Narrative:     [place] guidelines for Chronic Kidney Disease (CKD):   •  Stage 1 with normal or high GFR (GFR > 90 mL/min/1 73 square meters)  •  Stage 2 Mild CKD (GFR = 60-89 mL/min/1 73 square meters)  •  Stage 3A Moderate CKD (GFR = 45-59 mL/min/1 73 square meters)  •  Stage 3B Moderate CKD (GFR = 30-44 mL/min/1 73 square meters)  •  Stage 4 Severe CKD (GFR = 15-29 mL/min/1 73 square meters)  •  Stage 5 End Stage CKD (GFR <15 mL/min/1 73 square meters)  Note: GFR calculation is accurate only with a steady state creatinine     Time reflects when diagnosis was documented in both MDM as applicable and the Disposition within this note     Time User Action Codes Description Comment    5/17/2023 11:35 PM Chantal Mancuso Add [I47 1] SVT (supraventricular tachycardia) ([facility] )     5/17/2023 11:35 PM Chantal Mancuso Add [R00 0] Rapid heart rate       ED Disposition     ED Disposition   Discharge    Condition   Stable    Date/Time   Wed May 17, 2023 11:35 PM    Comment   Dada Peacock discharge to home/self "care                Follow-up Information     Follow up With Specialties Details Why Contact Info Additional [address] Emergency Department Emergency Medicine Go to  If symptoms worsen [address] Emergency Department, 600 [facility], [place], [place], [address]    1282 [facility] Cardiology Call today To make appt  for evaluation in the next month [address]  [address], [address] [facility], [place], [place], [address]        Patient's Medications   Discharge Prescriptions    No medications on file     No discharge procedures on file  Prior to Admission Medications   Prescriptions Last Dose Informant Patient Reported? Taking? Minoxidil 5 % FOAM   Yes No   Patient not taking: Reported on 8/25/2022      Facility-Administered Medications: None                        Portions of the record may have been created with voice recognition software  Occasional wrong word or \"sound a like\" substitutions may have occurred due to the inherent limitations of voice recognition software  Read the chart carefully and recognize, using context, where substitutions have occurred      Electronically signed by:  Elliot Byrne    "

## 2023-06-13 ENCOUNTER — OFFICE VISIT (OUTPATIENT)
Dept: CARDIOLOGY CLINIC | Facility: CLINIC | Age: 33
End: 2023-06-13
Payer: COMMERCIAL

## 2023-06-13 VITALS
WEIGHT: 178.2 LBS | SYSTOLIC BLOOD PRESSURE: 142 MMHG | HEIGHT: 73 IN | BODY MASS INDEX: 23.62 KG/M2 | HEART RATE: 87 BPM | DIASTOLIC BLOOD PRESSURE: 78 MMHG

## 2023-06-13 DIAGNOSIS — I47.1 PAROXYSMAL SVT (SUPRAVENTRICULAR TACHYCARDIA) (HCC): Primary | ICD-10-CM

## 2023-06-13 PROBLEM — I47.10 PAROXYSMAL SVT (SUPRAVENTRICULAR TACHYCARDIA): Status: ACTIVE | Noted: 2023-06-13

## 2023-06-13 PROCEDURE — 99244 OFF/OP CNSLTJ NEW/EST MOD 40: CPT | Performed by: INTERNAL MEDICINE

## 2023-06-13 NOTE — PROGRESS NOTES
Cardiology Consultation     Demetri Lee  [id number]  1990  [facility] [address] [address] [phone]    1  Paroxysmal SVT (supraventricular tachycardia) (HCC)  Echo complete w/ contrast if indicated         Chief Complaint   Patient presents with   • Follow-up     Hospital follow-up  HPI: Patient is here after recent evaluation in the ER for rapid heart rate  He noted on his watch that his heart rate was elevated in the 230s, confirmed on pulse oximeter  He did feel that he had an elevated heart rate and a little diaphoretic with some head pounding  He did have an energy drink that day, and may have been dehydrated that day  Patient feels well, without complaints  No reported chest pain, shortness of breath, palpitations, lightheadedness, syncope, LE edema, orthopnea, PND, or significant weight changes  Patient remains active without any increased fatigue out of the ordinary  Patient Active Problem List   Diagnosis   • Acute URI   • Dizziness   • Lab test positive for detection of COVID-19 virus   • Screening for human immunodeficiency virus   • Annual physical exam   • Paroxysmal SVT (supraventricular tachycardia) ([address] )     History reviewed  No pertinent past medical history    Social History     Socioeconomic History   • Marital status: /Civil Union     Spouse name: Not on file   • Number of children: Not on file   • Years of education: Not on file   • Highest education level: Not on file   Occupational History   • Not on file   Tobacco Use   • Smoking status: Never   • Smokeless tobacco: Never   Vaping Use   • Vaping Use: Never used   Substance and Sexual Activity   • Alcohol use: Never   • Drug use: Never   • Sexual activity: Not on file   Other Topics Concern   • Not on file   Social History Narrative   • Not on file     Social Determinants of Health "    Financial Resource Strain: Not on file   Food Insecurity: Not on file   Transportation Needs: Not on file   Physical Activity: Not on file   Stress: Not on file   Social Connections: Not on file   Intimate Partner Violence: Not on file   Housing Stability: Not on file      Family History   Problem Relation Age of Onset   • Thyroid disease Father    • Diabetes Maternal Grandmother    • Diabetes Maternal Grandfather    • Diabetes Paternal Grandmother    • Diabetes Paternal Grandfather    GM: Atrial flutter  GM: CABG  Uncle: heart related death/BRIANNA/obesity/unhealthy lifestyle    History reviewed  No pertinent surgical history      Current Outpatient Medications:   •  Minoxidil 5 % FOAM, , Disp: , Rfl:   No Known Allergies  Vitals:    06/13/23 1426   BP: 142/78   BP Location: Right arm   Patient Position: Sitting   Cuff Size: Standard   Pulse: 87   Weight: 80 8 kg (178 lb 3 2 oz)   Height: 6' 1\" (1 854 m)       Labs:  Admission on 05/17/2023, Discharged on 05/17/2023   Component Date Value   • Ventricular Rate 05/17/2023 199    • Atrial Rate 05/17/2023 199    • QRSD Interval 05/17/2023 82    • QT Interval 05/17/2023 232    • QTC Interval 05/17/2023 422    • P Axis 05/17/2023 -65    • QRS Axis 05/17/2023 98    • T Wave Axis 05/17/2023 -23    • Ventricular Rate 05/17/2023 149    • Atrial Rate 05/17/2023 149    • MT Interval 05/17/2023 140    • QRSD Interval 05/17/2023 76    • QT Interval 05/17/2023 258    • QTC Interval 05/17/2023 406    • P Axis 05/17/2023 62    • QRS Axis 05/17/2023 101    • T Wave Axis 05/17/2023 -23    • Sodium 05/17/2023 135    • Potassium 05/17/2023 3 5    • Chloride 05/17/2023 107    • CO2 05/17/2023 25    • ANION GAP 05/17/2023 3 (L)    • BUN 05/17/2023 15    • Creatinine 05/17/2023 1 15    • Glucose 05/17/2023 117    • Calcium 05/17/2023 9 3    • eGFR 05/17/2023 84      Lab Results   Component Value Date    HDL 39 (L) 09/01/2021    TRIG 251 (H) 09/01/2021     Imaging: US bedside " procedure    Result Date: 5/18/2023  Narrative: 1 2 840 [id number]  2 446 246 [id number] 222 1      Review of Systems:  Review of Systems   Constitutional: Negative for activity change, appetite change, fatigue and fever  HENT: Negative for nosebleeds and sore throat  Eyes: Negative for photophobia and visual disturbance  Respiratory: Negative for cough, chest tightness, shortness of breath and wheezing  Cardiovascular: Negative for chest pain, palpitations and leg swelling  Gastrointestinal: Negative for abdominal pain, diarrhea, nausea and vomiting  Endocrine: Negative for polyuria  Genitourinary: Negative for dysuria, frequency and hematuria  Musculoskeletal: Negative for arthralgias, back pain and gait problem  Skin: Negative for pallor and rash  Neurological: Negative for dizziness, syncope, speech difficulty and light-headedness  Hematological: Does not bruise/bleed easily  Psychiatric/Behavioral: Negative for agitation, behavioral problems and confusion  Physical Exam:  Physical Exam  Vitals reviewed  Constitutional:       General: He is not in acute distress  Appearance: He is well-developed  He is not diaphoretic  HENT:      Head: Normocephalic and atraumatic  Nose: Nose normal    Eyes:      General: No scleral icterus  Pupils: Pupils are equal, round, and reactive to light  Neck:      Vascular: No JVD  Cardiovascular:      Rate and Rhythm: Normal rate and regular rhythm  Heart sounds: S1 normal and S2 normal  Heart sounds not distant  No murmur heard  No systolic murmur is present  No friction rub  No gallop  No S3 sounds  Pulmonary:      Effort: Pulmonary effort is normal  No respiratory distress  Breath sounds: Normal breath sounds  No wheezing or rales  Abdominal:      General: Bowel sounds are normal  There is no distension  Palpations: Abdomen is soft  Musculoskeletal:         General: No deformity        Cervical back: "Normal range of motion and neck supple  Skin:     General: Skin is warm and dry  Findings: No erythema  Neurological:      Mental Status: He is alert and oriented to person, place, and time  Cranial Nerves: No cranial nerve deficit  Psychiatric:         Behavior: Behavior normal        Blood pressure 142/78, pulse 87, height 6' 1\" (1 854 m), weight 80 8 kg (178 lb 3 2 oz)  Discussion/Summary:  SVT: Confirmed on EKG in the ER  Without significant symptoms  Only noted on his watch  Can use watch for detection of arrhythmia  For the time being, can hold off on medical therapy, as patient's not overly symptomatic  Did advise to reduce energy drink consumption  Also recommended to increase hydration  We will check an echocardiogram to rule out structural heart disease    "

## 2023-06-13 NOTE — LETTER
June 13, 2023     Eugenio Chapman, [address]  [address] [place] [address]    Patient: Zo Santos   YOB: 1990   Date of Visit: 6/13/2023       Dear Dr Callie Osuna: Thank you for referring Zo Santos to me for evaluation  Below are my notes for this consultation  If you have questions, please do not hesitate to call me  I look forward to following your patient along with you  Sincerely,        Brain Tao MD        CC: No Recipients    Brain Tao MD  6/13/2023  2:35 PM  Incomplete                                             Cardiology Consultation     Zo Santos  [id number]  1990  [facility] [address] [address]  [phone]    No diagnosis found  Chief Complaint   Patient presents with   • Follow-up     Hospital follow-up  HPI: Patient is here after recent evaluation in the ER for rapid heart rate  He noted on his watch that his heart rate was elevated in the 230s, confirmed on pulse oximeter  No symptoms at the time  He did have an energy drink with caffeine earlier in the day  Patient feels well, without complaints  No reported chest pain, shortness of breath, palpitations, lightheadedness, syncope, LE edema, orthopnea, PND, or significant weight changes  Patient remains active without any increased fatigue out of the ordinary  Patient Active Problem List   Diagnosis   • Acute URI   • Dizziness   • Lab test positive for detection of COVID-19 virus   • Screening for human immunodeficiency virus   • Annual physical exam     No past medical history on file    Social History     Socioeconomic History   • Marital status: /Civil Union     Spouse name: Not on file   • Number of children: Not on file   • Years of education: Not on file   • Highest education level: Not on file   Occupational History   • Not on file   Tobacco Use   • Smoking status: Never   • Smokeless tobacco: "Never   Vaping Use   • Vaping Use: Never used   Substance and Sexual Activity   • Alcohol use: Never   • Drug use: Never   • Sexual activity: Not on file   Other Topics Concern   • Not on file   Social History Narrative   • Not on file     Social Determinants of Health     Financial Resource Strain: Not on file   Food Insecurity: Not on file   Transportation Needs: Not on file   Physical Activity: Not on file   Stress: Not on file   Social Connections: Not on file   Intimate Partner Violence: Not on file   Housing Stability: Not on file      Family History   Problem Relation Age of Onset   • Thyroid disease Father    • Diabetes Maternal Grandmother    • Diabetes Maternal Grandfather    • Diabetes Paternal Grandmother    • Diabetes Paternal Grandfather      No past surgical history on file      Current Outpatient Medications:   •  Minoxidil 5 % FOAM, , Disp: , Rfl:   No Known Allergies  Vitals:    06/13/23 1426   BP: 142/78   BP Location: Right arm   Patient Position: Sitting   Cuff Size: Standard   Pulse: 87   Weight: 80 8 kg (178 lb 3 2 oz)   Height: 6' 1\" (1 854 m)       Labs:  Admission on 05/17/2023, Discharged on 05/17/2023   Component Date Value   • Ventricular Rate 05/17/2023 199    • Atrial Rate 05/17/2023 199    • QRSD Interval 05/17/2023 82    • QT Interval 05/17/2023 232    • QTC Interval 05/17/2023 422    • P Axis 05/17/2023 -65    • QRS Axis 05/17/2023 98    • T Wave Axis 05/17/2023 -23    • Ventricular Rate 05/17/2023 149    • Atrial Rate 05/17/2023 149    • ID Interval 05/17/2023 140    • QRSD Interval 05/17/2023 76    • QT Interval 05/17/2023 258    • QTC Interval 05/17/2023 406    • P Axis 05/17/2023 62    • QRS Axis 05/17/2023 101    • T Wave Axis 05/17/2023 -23    • Sodium 05/17/2023 135    • Potassium 05/17/2023 3 5    • Chloride 05/17/2023 107    • CO2 05/17/2023 25    • ANION GAP 05/17/2023 3 (L)    • BUN 05/17/2023 15    • Creatinine 05/17/2023 1 15    • Glucose 05/17/2023 117    • Calcium " "05/17/2023 9 3    • eGFR 05/17/2023 84      Lab Results   Component Value Date    HDL 39 (L) 09/01/2021    TRIG 251 (H) 09/01/2021     Imaging: US bedside procedure    Result Date: 5/18/2023  Narrative: 1 2 840 [id number]  2 446 246 [id number] 222 1      Review of Systems:  Review of Systems    Physical Exam:  Physical Exam  Blood pressure 142/78, pulse 87, height 6' 1\" (1 854 m), weight 80 8 kg (178 lb 3 2 oz)  Discussion/Summary:  SVT: Confirmed on EKG in the ER  Without significant symptoms  Only noted on his watch  Can use watch for detection of arrhythmia  For the time being, can hold off on medical therapy, as patient's not overly symptomatic  We will check an echocardiogram to rule out structural heart disease      "

## 2023-06-30 ENCOUNTER — HOSPITAL ENCOUNTER (OUTPATIENT)
Dept: NON INVASIVE DIAGNOSTICS | Facility: CLINIC | Age: 33
Discharge: HOME | End: 2023-06-30
Payer: COMMERCIAL

## 2023-06-30 VITALS
HEART RATE: 100 BPM | WEIGHT: 178 LBS | SYSTOLIC BLOOD PRESSURE: 142 MMHG | BODY MASS INDEX: 23.59 KG/M2 | HEIGHT: 73 IN | DIASTOLIC BLOOD PRESSURE: 78 MMHG

## 2023-06-30 DIAGNOSIS — I47.1 PAROXYSMAL SVT (SUPRAVENTRICULAR TACHYCARDIA) (HCC): ICD-10-CM

## 2023-06-30 LAB
AORTIC ROOT: 2.9 CM
APICAL FOUR CHAMBER EJECTION FRACTION: 62 %
ASCENDING AORTA: 2.8 CM
E WAVE DECELERATION TIME: 158 MS
FRACTIONAL SHORTENING: 29 % (ref 28–44)
INTERVENTRICULAR SEPTUM IN DIASTOLE (PARASTERNAL SHORT AXIS VIEW): 0.8 CM
INTERVENTRICULAR SEPTUM: 0.8 CM (ref 0.6–1.1)
LAAS-AP2: 14.7 CM2
LAAS-AP4: 10.6 CM2
LEFT ATRIUM SIZE: 2.6 CM
LEFT ATRIUM VOLUME (MOD BIPLANE): 27 ML
LEFT INTERNAL DIMENSION IN SYSTOLE: 3.6 CM (ref 2.1–4)
LEFT VENTRICULAR INTERNAL DIMENSION IN DIASTOLE: 5.1 CM (ref 3.5–6)
LEFT VENTRICULAR POSTERIOR WALL IN END DIASTOLE: 0.8 CM
LEFT VENTRICULAR STROKE VOLUME: 70 ML
LVSV (TEICH): 70 ML
MV E'TISSUE VEL-SEP: 13 CM/S
MV PEAK A VEL: 0.77 M/S
MV PEAK E VEL: 73 CM/S
MV STENOSIS PRESSURE HALF TIME: 46 MS
MV VALVE AREA P 1/2 METHOD: 4.78 CM2
RIGHT ATRIUM AREA SYSTOLE A4C: 14.7 CM2
RIGHT VENTRICLE ID DIMENSION: 3 CM
SL CV LEFT ATRIUM LENGTH A2C: 4.9 CM
SL CV LV EF: 65
SL CV PED ECHO LEFT VENTRICLE DIASTOLIC VOLUME (MOD BIPLANE) 2D: 123 ML
SL CV PED ECHO LEFT VENTRICLE SYSTOLIC VOLUME (MOD BIPLANE) 2D: 54 ML
TR MAX PG: 18 MMHG
TR PEAK VELOCITY: 2.1 M/S
TRICUSPID ANNULAR PLANE SYSTOLIC EXCURSION: 1.6 CM
TRICUSPID VALVE PEAK REGURGITATION VELOCITY: 2.12 M/S

## 2023-06-30 PROCEDURE — 93306 TTE W/DOPPLER COMPLETE: CPT

## 2023-08-04 ENCOUNTER — OFFICE VISIT (OUTPATIENT)
Dept: CARDIOLOGY CLINIC | Facility: CLINIC | Age: 33
End: 2023-08-04
Payer: COMMERCIAL

## 2023-08-04 ENCOUNTER — APPOINTMENT (OUTPATIENT)
Dept: LAB | Facility: CLINIC | Age: 33
End: 2023-08-04

## 2023-08-04 VITALS
HEIGHT: 73 IN | WEIGHT: 177 LBS | HEART RATE: 87 BPM | DIASTOLIC BLOOD PRESSURE: 60 MMHG | SYSTOLIC BLOOD PRESSURE: 136 MMHG | BODY MASS INDEX: 23.46 KG/M2

## 2023-08-04 DIAGNOSIS — Z00.00 ANNUAL PHYSICAL EXAM: ICD-10-CM

## 2023-08-04 DIAGNOSIS — Z00.8 HEALTH EXAMINATION IN POPULATION SURVEY: ICD-10-CM

## 2023-08-04 DIAGNOSIS — I47.1 PAROXYSMAL SVT (SUPRAVENTRICULAR TACHYCARDIA) (HCC): Primary | ICD-10-CM

## 2023-08-04 LAB
CHOLEST SERPL-MCNC: 158 MG/DL
EST. AVERAGE GLUCOSE BLD GHB EST-MCNC: 111 MG/DL
HBA1C MFR BLD: 5.5 %
HDLC SERPL-MCNC: 47 MG/DL
LDLC SERPL CALC-MCNC: 78 MG/DL (ref 0–100)
TRIGL SERPL-MCNC: 166 MG/DL

## 2023-08-04 PROCEDURE — 83036 HEMOGLOBIN GLYCOSYLATED A1C: CPT

## 2023-08-04 PROCEDURE — 99214 OFFICE O/P EST MOD 30 MIN: CPT | Performed by: INTERNAL MEDICINE

## 2023-08-04 PROCEDURE — 80061 LIPID PANEL: CPT

## 2023-08-04 PROCEDURE — 36415 COLL VENOUS BLD VENIPUNCTURE: CPT

## 2023-08-04 NOTE — PROGRESS NOTES
Cardiology Consultation     James Bryant  [id number]  1990  [facility] [place] [address] [address] [id number] [phone]    1. Paroxysmal SVT (supraventricular tachycardia) [facility])           Chief Complaint   Patient presents with   • Follow-up     2 months - one episode of palpitations since last visit. HPI: Patient had one episode of pounding since last visit - lasted about 10 seconds or so and went away on its own. Otherwise he feels well, without complaints. No reported chest pain, shortness of breath, lightheadedness, syncope, LE edema, orthopnea, PND, or significant weight changes. Patient remains active without any increased fatigue out of the ordinary. Patient Active Problem List   Diagnosis   • Acute URI   • Dizziness   • Lab test positive for detection of COVID-19 virus   • Screening for human immunodeficiency virus   • Annual physical exam   • Paroxysmal SVT (supraventricular tachycardia) ([address])     History reviewed. No pertinent past medical history.   Social History     Socioeconomic History   • Marital status: /Civil Union     Spouse name: Not on file   • Number of children: Not on file   • Years of education: Not on file   • Highest education level: Not on file   Occupational History   • Not on file   Tobacco Use   • Smoking status: Never   • Smokeless tobacco: Never   Vaping Use   • Vaping Use: Never used   Substance and Sexual Activity   • Alcohol use: Never   • Drug use: Never   • Sexual activity: Not on file   Other Topics Concern   • Not on file   Social History Narrative   • Not on file     Social Determinants of Health     Financial Resource Strain: Not on file   Food Insecurity: Not on file   Transportation Needs: Not on file   Physical Activity: Not on file   Stress: Not on file   Social Connections: Not on file   Intimate Partner Violence: Not on file Housing Stability: Not on file      Family History   Problem Relation Age of Onset   • Thyroid disease Father    • Diabetes Maternal Grandmother    • Diabetes Maternal Grandfather    • Diabetes Paternal Grandmother    • Diabetes Paternal Grandfather    GM: Atrial flutter  GM: CABG  Uncle: heart related death/BRIANNA/obesity/unhealthy lifestyle    History reviewed. No pertinent surgical history.     Current Outpatient Medications:   •  Minoxidil 5 % FOAM, , Disp: , Rfl:   No Known Allergies  Vitals:    08/04/23 1349   BP: 136/60   BP Location: Right arm   Patient Position: Sitting   Cuff Size: Standard   Pulse: 87   Weight: 80.3 kg (177 lb)   Height: 6' 1" (1.854 m)       Labs:  Hospital Outpatient Visit on 06/30/2023   Component Date Value   • LA size 06/30/2023 2.6    • Triscuspid Valve Regurgi* 06/30/2023 18.0    • Tricuspid valve peak reg* 06/30/2023 2.12    • LVPWd 06/30/2023 0.80    • Left Atrium Area-systoli* 06/30/2023 14.7    • Left Atrium Area-systoli* 06/30/2023 10.6    • MV E' Tissue Velocity Se* 06/30/2023 13    • Tricuspid annular plane * 06/30/2023 1.60    • TR Peak Guicho 06/30/2023 2.1    • IVSd 06/30/2023 5.57    • LV DIASTOLIC VOLUME (MOD* 80/39/9030 123    • LEFT VENTRICLE SYSTOLIC * 92/61/0804 54    • Left ventricular stroke * 06/30/2023 70.00    • A4C EF 06/30/2023 62    • LA length (A2C) 06/30/2023 4.90    • LVIDd 06/30/2023 5.10    • IVS 06/30/2023 0.8    • LVIDS 06/30/2023 3.60    • FS 06/30/2023 29    • LA volume (BP) 06/30/2023 27    • Asc Ao 06/30/2023 2.8    • Ao root 06/30/2023 2.90    • RVID d 06/30/2023 3.0    • MV valve area p 1/2 meth* 06/30/2023 4.78    • E wave deceleration time 06/30/2023 158    • MV Peak E Guicho 06/30/2023 73    • MV Peak A Guicho 06/30/2023 0.77    • RAA A4C 06/30/2023 14.7    • MV stenosis pressure 1/2* 06/30/2023 46    • LVSV, 2D 06/30/2023 70    • LV EF 06/30/2023 65    Admission on 05/17/2023, Discharged on 05/17/2023   Component Date Value   • Ventricular Rate 05/17/2023 199    • Atrial Rate 05/17/2023 199    • QRSD Interval 05/17/2023 82    • QT Interval 05/17/2023 232    • QTC Interval 05/17/2023 422    • P Axis 05/17/2023 -65    • QRS Axis 05/17/2023 98    • T Wave Axis 05/17/2023 -23    • Ventricular Rate 05/17/2023 149    • Atrial Rate 05/17/2023 149    • [place] Interval 05/17/2023 140    • QRSD Interval 05/17/2023 76    • QT Interval 05/17/2023 258    • QTC Interval 05/17/2023 406    • P Axis 05/17/2023 62    • QRS Axis 05/17/2023 101    • T Wave Axis 05/17/2023 -23    • Sodium 05/17/2023 135    • Potassium 05/17/2023 3.5    • Chloride 05/17/2023 107    • CO2 05/17/2023 25    • ANION GAP 05/17/2023 3 (L)    • BUN 05/17/2023 15    • Creatinine 05/17/2023 1.15    • Glucose 05/17/2023 117    • Calcium 05/17/2023 9.3    • eGFR 05/17/2023 84      Lab Results   Component Value Date    TRIG 251 (H) 09/01/2021    HDL 39 (L) 09/01/2021     Imaging: US bedside procedure    Result Date: 5/18/2023  Narrative: [id number]. [id number]      Review of Systems:  Review of Systems   Constitutional: Negative for activity change, appetite change, fatigue and fever. HENT: Negative for nosebleeds and sore throat. Eyes: Negative for photophobia and visual disturbance. Respiratory: Negative for cough, chest tightness, shortness of breath and wheezing. Cardiovascular: Negative for chest pain, palpitations and leg swelling. Gastrointestinal: Negative for abdominal pain, diarrhea, nausea and vomiting. Endocrine: Negative for polyuria. Genitourinary: Negative for dysuria, frequency and hematuria. Musculoskeletal: Negative for arthralgias, back pain and gait problem. Skin: Negative for pallor and rash. Neurological: Negative for dizziness, syncope, speech difficulty and light-headedness. Hematological: Does not bruise/bleed easily. Psychiatric/Behavioral: Negative for agitation, behavioral problems and confusion.        Physical Exam:  Physical Exam  Vitals reviewed. Constitutional:       General: He is not in acute distress. Appearance: He is well-developed. He is not diaphoretic. HENT:      Head: Normocephalic and atraumatic. Nose: Nose normal.   Eyes:      General: No scleral icterus. Pupils: Pupils are equal, round, and reactive to light. Neck:      Vascular: No JVD. Cardiovascular:      Rate and Rhythm: Normal rate and regular rhythm. Heart sounds: S1 normal and S2 normal. Heart sounds not distant. No murmur heard. No systolic murmur is present. No friction rub. No gallop. No S3 sounds. Pulmonary:      Effort: Pulmonary effort is normal. No respiratory distress. Breath sounds: Normal breath sounds. No wheezing or rales. Abdominal:      General: Bowel sounds are normal. There is no distension. Palpations: Abdomen is soft. Musculoskeletal:         General: No deformity. Cervical back: Normal range of motion and neck supple. Skin:     General: Skin is warm and dry. Findings: No erythema. Neurological:      Mental Status: He is alert and oriented to person, place, and time. Cranial Nerves: No cranial nerve deficit. Psychiatric:         Behavior: Behavior normal.       Blood pressure 136/60, pulse 87, height 6' 1" (1.854 m), weight 80.3 kg (177 lb). Discussion/Summary:  SVT: Confirmed on EKG in the ER. Without significant symptoms. Only noted on his watch. Can use watch for detection of arrhythmia. For the time being, can hold off on medical therapy, as patient's not overly symptomatic. Did advise to reduce energy drink consumption. Also recommended to increase hydration. Echocardiogram revealed normal LV and RV size and function with no significant valvular disease.

## 2023-08-10 NOTE — RESULT ENCOUNTER NOTE
I left a detailed message for patient to please give a call to make sure that he is aware of Dr Lind message.

## 2023-12-03 ENCOUNTER — AMB VIDEO VISIT (OUTPATIENT)
Dept: OTHER | Facility: HOSPITAL | Age: 33
End: 2023-12-03
Payer: COMMERCIAL

## 2023-12-03 PROCEDURE — 99212 OFFICE O/P EST SF 10 MIN: CPT | Performed by: FAMILY MEDICINE

## 2023-12-03 NOTE — CARE ANYWHERE EVISITS
Visit Summary for Jeannie AVILA - Gender: Male - Date of Birth: [id number]  Date: [id number] - Duration: 9 minutes  Patient: Jeannie Mathis MARGARITA  Provider: Dwaine Beverly    Patient Contact Information  Address  [address]; [address]  [phone]    Visit Topics  Sore throat and nasal congestion  [Added By: Self - 2023-12-03]    Triage Questions   What is your current physical address in the event of a medical emergency? Answer []  Are you allergic to any medications? Answer []  Are you now or could you be pregnant? Answer []  Do you have any immune system compromise or chronic lung   disease? Answer []  Do you have any vulnerable family members in the home (infant, pregnant, cancer, elderly)? Answer []     Conversation Transcripts  [0A][0A] [Notification] You are connected with Dwaine Beverly Family Physician.[0A][Notification] ISAAC BEAVERSJose AVILA is located in [place]. [0A][Notification] ISAAC AVILA has shared health history. [facility] .[0A]    Diagnosis  Acute maxillary sinusitis, unspecified    Procedures  Value: [id number] Code: CPT-4 UNLISTED E&M SERVICE    Medications Prescribed    amoxicillin-pot clavulanate      Frequency :   Patient Instructions : one tablet PO BID  Refills : 0  Instructions to the Pharmacist : Substitutions allowed      Provider Notes  [0A][0A] Subjective:[0A]The patient presents with a chief complaint of difficulty swallowing, accompanied by pain and swollen glands for approximately two to three weeks. The patient also reports nasal congestion and experiencing a cold virus a couple of   weeks ago, with lingering effects. [0A]The patient describes feeling significant pressure and blockage, making it difficult to breathe at night. They report waking up with a sore throat and post-nasal drip, as well as difficulty clearing secretions and   experiencing pressure in the sinuses. [0A]The patient has a history of one documented episode of supraventricular tachycardia (SVT) but no other significant health problems. They have been taking Allegra for allergies for the past month and deny any known   medication allergies. The patient is a non-smoker.[0A]Objective:[0A]Vital signs: Not mentioned. [0A]Physical examination findings: Not mentioned. [0A]Diagnostic test results: Not mentioned. [0A]Assessment & Plan:[0A]1. Acute sinusitisitis:[0A]- Diagnosis:   Likely pharyngitis, possibly secondary to a recent viral infection[0A]- Plan:[0A]  a. Encourage the patient to drink warm fluids and avoid cold fluids, ice, ice cream, chocolate, and cake[0A]  b. Continue Allegra for allergy symptoms[0A]  c. Prescribe   Augmentin (antibiotic) for seven days to address possible bacterial infection[0A]  d. Advise the patient to take DayQuil and NightQuil as needed for cough relief[0A]  b. Encourage the patient to use saline nasal spray to help alleviate congestion[0A]  c. Advise the patient to take DayQuil and NightQuil as needed for symptom relief[0A]3. History of SVT[0A]- No acute issues related to SVT during this visit[0A]- Plan: Continue monitoring and follow up as needed[0A]4. Allergies[0A]- No known drug   allergies[0A]- Plan: Continue monitoring and document any new allergies if they arise[0A]5.  Social history[0A]- Non-smoker[0A]- Plan: Encourage the patient to maintain a smoke-free lifestyle[0A]Pharmacy: [address]    Electronically signed byJuanjo Mock(NPI [id number])

## 2024-02-21 PROBLEM — J06.9 ACUTE URI: Status: RESOLVED | Noted: 2020-03-31 | Resolved: 2024-02-21

## 2025-08-12 ENCOUNTER — APPOINTMENT (OUTPATIENT)
Dept: LAB | Facility: CLINIC | Age: 35
End: 2025-08-12

## 2025-08-15 ENCOUNTER — OFFICE VISIT (OUTPATIENT)
Dept: FAMILY MEDICINE CLINIC | Facility: CLINIC | Age: 35
End: 2025-08-15
Payer: COMMERCIAL

## 2025-08-15 VITALS
WEIGHT: 186 LBS | HEART RATE: 76 BPM | BODY MASS INDEX: 24.65 KG/M2 | HEIGHT: 73 IN | DIASTOLIC BLOOD PRESSURE: 84 MMHG | SYSTOLIC BLOOD PRESSURE: 110 MMHG | TEMPERATURE: 96.2 F | OXYGEN SATURATION: 98 %

## 2025-08-15 DIAGNOSIS — I47.10 PAROXYSMAL SVT (SUPRAVENTRICULAR TACHYCARDIA) (HCC): Primary | ICD-10-CM

## 2025-08-15 DIAGNOSIS — Z11.59 NEED FOR HEPATITIS C SCREENING TEST: ICD-10-CM

## 2025-08-15 PROBLEM — Z00.00 ANNUAL PHYSICAL EXAM: Status: RESOLVED | Noted: 2022-08-25 | Resolved: 2025-08-15

## 2025-08-15 PROBLEM — Z11.4 SCREENING FOR HUMAN IMMUNODEFICIENCY VIRUS: Status: RESOLVED | Noted: 2021-01-07 | Resolved: 2025-08-15

## 2025-08-15 PROBLEM — R42 DIZZINESS: Status: RESOLVED | Noted: 2020-03-31 | Resolved: 2025-08-15

## 2025-08-15 PROBLEM — U07.1 LAB TEST POSITIVE FOR DETECTION OF COVID-19 VIRUS: Status: RESOLVED | Noted: 2021-01-07 | Resolved: 2025-08-15

## 2025-08-15 PROCEDURE — 99204 OFFICE O/P NEW MOD 45 MIN: CPT | Performed by: FAMILY MEDICINE

## 2025-08-15 RX ORDER — MINOXIDIL 2.5 MG/1
2.5 TABLET ORAL DAILY
COMMUNITY

## 2025-08-15 RX ORDER — FINASTERIDE 1 MG/1
1 TABLET, FILM COATED ORAL DAILY
COMMUNITY